# Patient Record
Sex: FEMALE | Race: WHITE | NOT HISPANIC OR LATINO | URBAN - METROPOLITAN AREA
[De-identification: names, ages, dates, MRNs, and addresses within clinical notes are randomized per-mention and may not be internally consistent; named-entity substitution may affect disease eponyms.]

---

## 2017-11-20 ENCOUNTER — EMERGENCY (EMERGENCY)
Facility: HOSPITAL | Age: 57
LOS: 1 days | Discharge: HOME | End: 2017-11-20
Admitting: EMERGENCY MEDICINE
Payer: COMMERCIAL

## 2017-11-20 VITALS
TEMPERATURE: 99 F | DIASTOLIC BLOOD PRESSURE: 78 MMHG | OXYGEN SATURATION: 100 % | HEART RATE: 70 BPM | SYSTOLIC BLOOD PRESSURE: 134 MMHG | RESPIRATION RATE: 18 BRPM

## 2017-11-20 VITALS
OXYGEN SATURATION: 99 % | DIASTOLIC BLOOD PRESSURE: 62 MMHG | HEART RATE: 61 BPM | SYSTOLIC BLOOD PRESSURE: 100 MMHG | RESPIRATION RATE: 16 BRPM

## 2017-11-20 PROCEDURE — 70360 X-RAY EXAM OF NECK: CPT | Mod: 26

## 2017-11-20 PROCEDURE — 99284 EMERGENCY DEPT VISIT MOD MDM: CPT

## 2017-11-20 NOTE — ED PROVIDER NOTE - MEDICAL DECISION MAKING DETAILS
sore throat after swallowing chicken bone/feels stuck in throat:   xray ST neck, pain control prn, ENT eval

## 2017-11-20 NOTE — CONSULT NOTE ADULT - SUBJECTIVE AND OBJECTIVE BOX
57healthy female was eating chicken yesterday when she felt as if something was stuck in her throat.  She reports this feeling has persisted.  She is able to tolerate PO diet and has no airway distress.  She reports discomfort in left neck/throat when swallowing saliva.  No fever, no coughing or choking.    PMH: none reported  PSH: none reported  Allergies: NKDA    Vss  awake, alert  interactive  breathing comfortably  face symmetric  NC pink moist mucosa  OC/OP pink mucosa, small white linear 2cm FB stuck in superior pole of left tonsil. removed at bedside.  No other abnormalities:    Xr neck: normal    A/P s/p removal of L oropharyngeal foreign body with complete resolution of symptoms.  - No further ENT needs  - advil or tylenol as needed for irritation  - follow up with primary care doctor as needed  - discussed with attending on call

## 2017-11-20 NOTE — ED PROVIDER NOTE - OBJECTIVE STATEMENT
56 yo female, no significant medical hx presents to the ED co sore throat left side since yesterday afternoon. Pt sates she was eating chicken wings and feels like a small piece of the bone got stuck in her throat. Denies any difficulty speaking, any drooling or hoarseness, difficulty breathing/wheezing, cough, difficulty swallowing solids and liquids, fevers, chills, nausea or vomiting.

## 2017-11-20 NOTE — ED PROVIDER NOTE - PLAN OF CARE
Follow up with your primary physician for a post hospital visit within 48 hours, taking all results from the ER to be reviewed.  For pain control if needed take Tylenol or Motrin as directed. If any concerning signs or symptoms return to the ER

## 2017-11-20 NOTE — ED PROVIDER NOTE - CARE PLAN
Principal Discharge DX:	Sore throat Principal Discharge DX:	Sore throat  Instructions for follow-up, activity and diet:	Follow up with your primary physician for a post hospital visit within 48 hours, taking all results from the ER to be reviewed.  For pain control if needed take Tylenol or Motrin as directed. If any concerning signs or symptoms return to the ER Principal Discharge DX:	Foreign body of throat  Instructions for follow-up, activity and diet:	Follow up with your primary physician for a post hospital visit within 48 hours, taking all results from the ER to be reviewed.  For pain control if needed take Tylenol or Motrin as directed. If any concerning signs or symptoms return to the ER  Secondary Diagnosis:	Sore throat

## 2017-11-20 NOTE — ED PROVIDER NOTE - ENMT, MLM
Airway patent, Nasal mucosa clear. Mouth with normal mucosa. Throat has no vesicles, no oropharyngeal exudates and uvula is midline. Airway patent, Nasal mucosa clear. Mouth with normal mucosa. Throat has no vesicles, no oropharyngeal exudates and uvula is midline. No FB seen

## 2017-11-20 NOTE — ED PROVIDER NOTE - PROGRESS NOTE DETAILS
paged multiple times ENT with no answer. STill pending CB. Xray ST neck no FB seen on pre-huggins. Pt in NAD, resting comfortably. [facility] will see pt in ED. Seen by ENT, small FB removed superior pole lt tonsil bedside.  No further ENT follow up needed. Will dc home with pain control. Pt asx at time of dc.